# Patient Record
Sex: FEMALE | Race: WHITE | Employment: UNEMPLOYED | URBAN - METROPOLITAN AREA
[De-identification: names, ages, dates, MRNs, and addresses within clinical notes are randomized per-mention and may not be internally consistent; named-entity substitution may affect disease eponyms.]

---

## 2023-06-21 ENCOUNTER — HOSPITAL ENCOUNTER (EMERGENCY)
Age: 23
Discharge: HOME | End: 2023-06-21
Attending: EMERGENCY MEDICINE
Payer: COMMERCIAL

## 2023-06-21 VITALS
HEIGHT: 61 IN | BODY MASS INDEX: 26.43 KG/M2 | TEMPERATURE: 97.6 F | WEIGHT: 140 LBS | DIASTOLIC BLOOD PRESSURE: 71 MMHG | HEART RATE: 70 BPM | OXYGEN SATURATION: 96 % | RESPIRATION RATE: 17 BRPM | SYSTOLIC BLOOD PRESSURE: 105 MMHG

## 2023-06-21 DIAGNOSIS — R55 VASOVAGAL EPISODE: ICD-10-CM

## 2023-06-21 DIAGNOSIS — R55 SYNCOPE AND COLLAPSE: Primary | ICD-10-CM

## 2023-06-21 LAB
BASOPHILS # BLD: 0.03 K/UL (ref 0–0.2)
BASOPHILS NFR BLD: 1 % (ref 0–2)
EOSINOPHIL # BLD: 0.39 K/UL (ref 0–0.44)
EOSINOPHILS RELATIVE PERCENT: 7 % (ref 1–4)
ERYTHROCYTE [DISTWIDTH] IN BLOOD BY AUTOMATED COUNT: 13 % (ref 11.8–14.4)
GLUCOSE BLD-MCNC: 82 MG/DL (ref 65–105)
HCG SERPL QL: NEGATIVE
HCT VFR BLD AUTO: 39.4 % (ref 36.3–47.1)
HGB BLD-MCNC: 12.8 G/DL (ref 11.9–15.1)
IMM GRANULOCYTES # BLD AUTO: <0.03 K/UL (ref 0–0.3)
IMM GRANULOCYTES NFR BLD: 0 %
LYMPHOCYTES # BLD: 39 % (ref 25–45)
LYMPHOCYTES NFR BLD: 2.25 K/UL (ref 1.2–5.2)
MCH RBC QN AUTO: 27.4 PG (ref 25.2–33.5)
MCHC RBC AUTO-ENTMCNC: 32.5 G/DL (ref 28.4–34.8)
MCV RBC AUTO: 84.2 FL (ref 82.6–102.9)
MONOCYTES NFR BLD: 0.5 K/UL (ref 0.1–1.4)
MONOCYTES NFR BLD: 9 % (ref 2–8)
NEUTROPHILS NFR BLD: 44 % (ref 34–64)
NEUTS SEG NFR BLD: 2.63 K/UL (ref 1.8–8)
NRBC AUTOMATED: 0 PER 100 WBC
PLATELET # BLD AUTO: 258 K/UL (ref 138–453)
PMV BLD AUTO: 9.4 FL (ref 8.1–13.5)
RBC # BLD AUTO: 4.68 M/UL (ref 3.95–5.11)
WBC OTHER # BLD: 5.8 K/UL (ref 4.5–13.5)

## 2023-06-21 PROCEDURE — 82947 ASSAY GLUCOSE BLOOD QUANT: CPT

## 2023-06-21 PROCEDURE — 84703 CHORIONIC GONADOTROPIN ASSAY: CPT

## 2023-06-21 PROCEDURE — 85027 COMPLETE CBC AUTOMATED: CPT

## 2023-06-21 PROCEDURE — 99284 EMERGENCY DEPT VISIT MOD MDM: CPT

## 2023-06-21 PROCEDURE — 93005 ELECTROCARDIOGRAM TRACING: CPT | Performed by: EMERGENCY MEDICINE

## 2023-06-21 NOTE — ED TRIAGE NOTES
Pt arrived to ED from hospital. Pt was with son upstairs in peds when she fainted. Pt stated she recently had a ce-section and lost a lot of blood. Pt stated she is also on her period at this time. Pt hooked up to monitor, bp, pulse ox. EKG completed. Labs drawn.  Pt appears to be  in no acute distress at this time

## 2023-06-21 NOTE — ED PROVIDER NOTES
Syncope and collapse    2.  Vasovagal episode            DISPOSITION/PLAN   DISPOSITION Decision To Discharge 06/21/2023 01:08:56 PM          PATIENT REFERRED TO:  Keep your post partum follow ups with ob            DISCHARGE MEDICATIONS:  New Prescriptions    No medications on file       (Please note that portions of this note were completed with a voice recognition program.  Efforts were made to edit the dictations but occasionally words are mis-transcribed.)    Gill Alfaro DO  Emergency Medicine Attending         Gill Alfaro DO  06/21/23 0822 [place] [id number],   06/21/23 1312

## 2023-06-22 LAB
EKG ATRIAL RATE: 63 BPM
EKG P AXIS: 45 DEGREES
EKG P-R INTERVAL: 146 MS
EKG Q-T INTERVAL: 402 MS
EKG QRS DURATION: 80 MS
EKG QTC CALCULATION (BAZETT): 411 MS
EKG R AXIS: 69 DEGREES
EKG T AXIS: 52 DEGREES
EKG VENTRICULAR RATE: 63 BPM

## 2023-06-22 PROCEDURE — 93010 ELECTROCARDIOGRAM REPORT: CPT | Performed by: INTERNAL MEDICINE

## 2024-01-22 ENCOUNTER — HOSPITAL ENCOUNTER (EMERGENCY)
Age: 24
Discharge: HOME | End: 2024-01-22
Payer: COMMERCIAL

## 2024-01-22 VITALS
OXYGEN SATURATION: 100 % | SYSTOLIC BLOOD PRESSURE: 150 MMHG | DIASTOLIC BLOOD PRESSURE: 90 MMHG | RESPIRATION RATE: 20 BRPM | HEART RATE: 99 BPM | TEMPERATURE: 97.9 F

## 2024-01-22 VITALS — BODY MASS INDEX: 21 KG/M2

## 2024-01-22 DIAGNOSIS — S61.512A: Primary | ICD-10-CM
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
## 2024-01-22 PROCEDURE — 99282 EMERGENCY DEPT VISIT SF MDM: CPT

## 2024-01-22 PROCEDURE — 12001 RPR S/N/AX/GEN/TRNK 2.5CM/<: CPT

## 2024-07-21 ENCOUNTER — HOSPITAL ENCOUNTER (EMERGENCY)
Age: 24
Discharge: HOME | End: 2024-07-21
Payer: COMMERCIAL

## 2024-07-21 VITALS — BODY MASS INDEX: 21 KG/M2

## 2024-07-21 VITALS
TEMPERATURE: 98 F | DIASTOLIC BLOOD PRESSURE: 85 MMHG | HEART RATE: 69 BPM | OXYGEN SATURATION: 99 % | SYSTOLIC BLOOD PRESSURE: 124 MMHG

## 2024-07-21 DIAGNOSIS — R07.89: Primary | ICD-10-CM

## 2024-07-21 LAB
CAST SEEN?: (no result) #/LPF
GLUCOSE URINE UA: NEGATIVE MG/DL
URINE CULTURE INDICATED: NO

## 2024-07-21 PROCEDURE — 85378 FIBRIN DEGRADE SEMIQUANT: CPT

## 2024-07-21 PROCEDURE — 81001 URINALYSIS AUTO W/SCOPE: CPT

## 2024-07-21 PROCEDURE — 71101 X-RAY EXAM UNILAT RIBS/CHEST: CPT

## 2024-07-21 PROCEDURE — 36415 COLL VENOUS BLD VENIPUNCTURE: CPT

## 2024-07-21 PROCEDURE — 99283 EMERGENCY DEPT VISIT LOW MDM: CPT

## 2025-03-16 ENCOUNTER — HOSPITAL ENCOUNTER (EMERGENCY)
Age: 25
Discharge: LEFT BEFORE DISCHARGE | End: 2025-03-16
Payer: COMMERCIAL

## 2025-03-16 VITALS
OXYGEN SATURATION: 98 % | TEMPERATURE: 98.5 F | HEART RATE: 95 BPM | DIASTOLIC BLOOD PRESSURE: 77 MMHG | SYSTOLIC BLOOD PRESSURE: 108 MMHG

## 2025-03-16 VITALS — BODY MASS INDEX: 22.3 KG/M2

## 2025-03-16 DIAGNOSIS — R10.32: ICD-10-CM

## 2025-03-16 DIAGNOSIS — R82.71: ICD-10-CM

## 2025-03-16 DIAGNOSIS — O34.81: ICD-10-CM

## 2025-03-16 DIAGNOSIS — O35.9XX0: ICD-10-CM

## 2025-03-16 DIAGNOSIS — O26.891: Primary | ICD-10-CM

## 2025-03-16 DIAGNOSIS — Z53.29: ICD-10-CM

## 2025-03-16 DIAGNOSIS — Z3A.01: ICD-10-CM

## 2025-03-16 DIAGNOSIS — N83.202: ICD-10-CM

## 2025-03-16 LAB
ADD MANUAL DIFF: NO
ANION GAP: 13.8
BLOOD UREA NITROGEN: 10 MG/DL (ref 7–18)
CALCIUM: 8.8 MG/DL (ref 8.5–10.1)
CARBON DIOXIDE: 26.4 MMOL/L (ref 21–32)
CAST SEEN?: (no result) #/LPF
CHLORIDE: 101 MMOL/L (ref 98–107)
CO2 BLD-SCNC: 26.4 MMOL/L (ref 21–32)
ESTIMATED GFR (AFRICAN AMERICA: >60
ESTIMATED GFR (NON-AFRICAN AME: >60
GLUCOSE BLD-MCNC: 100 MG/DL (ref 74–106)
GLUCOSE URINE UA: NEGATIVE MG/DL
HCT VFR BLD CALC: 39.1 % (ref 36–48)
HEMATOCRIT: 39.1 % (ref 36–48)
HEMOGLOBIN: 13.3 G/DL (ref 12–16)
IMMATURE GRANULOCYTES ABS AUTO: 0.01 10^3/UL (ref 0–0.03)
IMMATURE GRANULOCYTES PCT AUTO: 0.2 % (ref 0–0.5)
LYMPHOCYTES  ABSOLUTE AUTO: 2.4 10^3/UL (ref 1.2–3.8)
MCV RBC: 85.7 FL (ref 81–99)
MEAN CORPUSCULAR HEMOGLOBIN: 29.2 PG (ref 26.7–34)
MEAN CORPUSCULAR HGB CONC: 34 G/DL (ref 29.9–35.2)
MEAN CORPUSCULAR VOLUME: 85.7 FL (ref 81–99)
PLATELET # BLD: 251 10^3/UL (ref 150–450)
PLATELET COUNT: 251 10^3/UL (ref 150–450)
POTASSIUM SERPLBLD-SCNC: 3.2 MMOL/L (ref 3.5–5.1)
POTASSIUM: 3.2 MMOL/L (ref 3.5–5.1)
RED BLOOD COUNT: 4.56 10^6/UL (ref 4.2–5.4)
SODIUM BLD-SCNC: 138 MMOL/L (ref 136–145)
SODIUM: 138 MMOL/L (ref 136–145)
URINE CULTURE INDICATED: NO
WBC # BLD: 5.2 10^3/UL (ref 4–11)
WHITE BLOOD COUNT: 5.2 10^3/UL (ref 4–11)

## 2025-03-16 PROCEDURE — 86850 RBC ANTIBODY SCREEN: CPT

## 2025-03-16 PROCEDURE — 86901 BLOOD TYPING SEROLOGIC RH(D): CPT

## 2025-03-16 PROCEDURE — 84702 CHORIONIC GONADOTROPIN TEST: CPT

## 2025-03-16 PROCEDURE — 85025 COMPLETE CBC W/AUTO DIFF WBC: CPT

## 2025-03-16 PROCEDURE — 99284 EMERGENCY DEPT VISIT MOD MDM: CPT

## 2025-03-16 PROCEDURE — 80048 BASIC METABOLIC PNL TOTAL CA: CPT

## 2025-03-16 PROCEDURE — 86900 BLOOD TYPING SEROLOGIC ABO: CPT

## 2025-03-16 PROCEDURE — 81001 URINALYSIS AUTO W/SCOPE: CPT

## 2025-03-16 PROCEDURE — 76817 TRANSVAGINAL US OBSTETRIC: CPT

## 2025-03-16 PROCEDURE — 36415 COLL VENOUS BLD VENIPUNCTURE: CPT
